# Patient Record
(demographics unavailable — no encounter records)

---

## 2024-12-25 NOTE — PHYSICAL EXAM
[General Appearance - Well Developed] : well developed [Normal Appearance] : normal appearance [Well Groomed] : well groomed [No Deformities] : no deformities [General Appearance - Well Nourished] : well nourished [General Appearance - In No Acute Distress] : no acute distress [Auscultation Breath Sounds / Voice Sounds] : lungs were clear to auscultation bilaterally [Heart Rate And Rhythm] : heart rate was normal and rhythm regular [Heart Sounds] : normal S1 and S2 [Heart Sounds Gallop] : no gallops [Murmurs] : no murmurs [Heart Sounds Pericardial Friction Rub] : no pericardial rub [Abnormal Walk] : normal gait [Musculoskeletal - Swelling] : no joint swelling seen [Motor Tone] : muscle strength and tone were normal [Nail Clubbing] : no clubbing of the fingernails [Cyanosis, Localized] : no localized cyanosis [Petechial Hemorrhages (___cm)] : no petechial hemorrhages [] : no ischemic changes

## 2024-12-25 NOTE — ASSESSMENT
[FreeTextEntry1] : Mr. CLARENCE SEVILLA is a 42-year-old male presenting today for evaluation of possible sleep apnea. He was referred by dr. Jackie Benito.   Clarence is an active cigarette smoker of 1.5PPD x30 years.  He has chronic excessive daytime sleepiness and snoring for years. He does also report occasionally waking up in the middle of the night gasping for air. He has never been evaluation for CAROLE.   I ordered chest X-ray given chronic history of cigarette smoking. Chest x-ray performed in my office today with negative results.  Pulmonary Function Test data from today reviewed with patient.  Please pulmonary function test is consistent with restrictive lung defect likely secondary to history of obesity. 15 minutes spent in cigarette smoking cessation counseling. Educated patient on deleterious effects and all potential consequences of cigarette smoking, such as COPD, lung cancer, etc. Counseled patient on various smoking cessation techniques, such as nicotine patch, Zyban, acupuncture, etc.  Patient agreed to attempt cigarette smoking cessation.  Will follow up on cigarette smoking cessation on next office visit.  Based on history and physical exam the patient has a high likelihood of having obstructive sleep apnea. Further assessment by sleep testing is recommended. There is no contraindication to a home sleep study. We will therefore proceed to two-night home sleep apnea study for further assessment. F/U in office 2 weeks after completing home sleep study.

## 2024-12-25 NOTE — HISTORY OF PRESENT ILLNESS
[FreeTextEntry1] : Mr. CLARENCE SEVILLA is a 42-year-old male with history of DM2, Hypertension, Hyperlipidemia, fatty Liver, active cigarette smoker, who smokes 1.5PPD x30 years, who presents for initial pulmonary evaluation. He presents via the kind courtesy of Dr. Jackie Benito. Patient presents for evaluation for possible sleep apnea. The patient has chronic excessive daytime sleepiness and snoring for years. He does also report occasionally waking up in the middle of the night gasping for air. He has never been evaluation for CAROLE.

## 2024-12-25 NOTE — PROCEDURE
[FreeTextEntry1] : Pulmonary Function Test performed today, 12/24/2024, in my office: Spirometry: Suggestive of moderate restrictive defect  Lung Volume: mild restrictive defect  Diffusion: Within normal limits.  -------------  Xray Chest 2 Views PA/Lat             Final  No Documents Attached    	 Chest x-ray PA and lateral views performed in my office today showed clear lungs, no evidence of infiltrates or pleural effusions.  Ordered by: RAÚL GIMENEZ       Collected/Examined: [id number] 02:36PM       Verified by: RAÚL GIMENEZ [id number] 03:50PM       Result Communication: No patient communication needed at this time; Stage: Final       Performed at: In Office       Performed by: RAÚL GIMENEZ       Resulted: [id number] 02:36PM       Last Updated: [id number] 03:50PM       Accession: [id number]

## 2024-12-25 NOTE — ADDENDUM
[FreeTextEntry1] : This note was written by Javon Dias on 12/24/2024 acting as medical scribe for Dr. Julia Young. I, Dr. Julia Young, have read and attest that all the information, medical decision making and discharge instructions within are true and accurate.

## 2024-12-25 NOTE — REVIEW OF SYSTEMS
[Negative] : Psychiatric [Fatigue] : fatigue [Snoring] : snoring [Shortness Of Breath] : shortness of breath [FreeTextEntry3] : Excessive daytime sleepiness and snoring for years [FreeTextEntry8] : Excessive daytime sleepiness and snoring for years.  Occasional shortness of breath.

## 2024-12-25 NOTE — CONSULT LETTER
[Dear  ___] : Dear  [unfilled], [FreeTextEntry2] : Dr. Jackie Wynn [Courtesy Letter:] : I had the pleasure of seeing your patient, [unfilled], in my office today. [Please see my note below.] : Please see my note below. [Consult Closing:] : Thank you very much for allowing me to participate in the care of this patient.  If you have any questions, please do not hesitate to contact me. [Sincerely,] : Sincerely, [FreeTextEntry3] : Dr. Julia Young

## 2024-12-25 NOTE — PROCEDURE
[FreeTextEntry1] : Pulmonary Function Test performed today, 12/24/2024, in my office: Spirometry: Suggestive of moderate restrictive defect  Lung Volume: mild restrictive defect  Diffusion: Within normal limits.  -------------  Xray Chest 2 Views PA/Lat             Final  No Documents Attached    	 Chest x-ray PA and lateral views performed in my office today showed clear lungs, no evidence of infiltrates or pleural effusions.  Ordered by: RAÚL GIMENEZ       Collected/Examined: [id number] 02:36PM       Verified by: RAÚL GIMENEZ [id number] 03:50PM       Result Communication: No patient communication needed at this time; Stage: Final       Performed at: In Office       Performed by: RAÚL GIMENEZ       Resulted: [id number] 02:36PM       Last Updated: [id number] 03:50PM       Accession: [id number]